# Patient Record
Sex: MALE | Race: WHITE
[De-identification: names, ages, dates, MRNs, and addresses within clinical notes are randomized per-mention and may not be internally consistent; named-entity substitution may affect disease eponyms.]

---

## 2023-05-07 ENCOUNTER — APPOINTMENT (OUTPATIENT)
Dept: CARE COORDINATION | Facility: HOME HEALTH | Age: 88
End: 2023-05-07
Payer: MEDICARE

## 2023-05-07 VITALS — HEIGHT: 71 IN | BODY MASS INDEX: 23.8 KG/M2 | WEIGHT: 170 LBS

## 2023-05-07 DIAGNOSIS — G30.9 ALZHEIMER'S DISEASE, UNSPECIFIED: ICD-10-CM

## 2023-05-07 DIAGNOSIS — G47.00 INSOMNIA, UNSPECIFIED: ICD-10-CM

## 2023-05-07 DIAGNOSIS — F20.9 SCHIZOPHRENIA, UNSPECIFIED: ICD-10-CM

## 2023-05-07 DIAGNOSIS — F02.80 ALZHEIMER'S DISEASE, UNSPECIFIED: ICD-10-CM

## 2023-05-07 DIAGNOSIS — F32.0 MAJOR DEPRESSIVE DISORDER, SINGLE EPISODE, MILD: ICD-10-CM

## 2023-05-07 DIAGNOSIS — I10 ESSENTIAL (PRIMARY) HYPERTENSION: ICD-10-CM

## 2023-05-07 DIAGNOSIS — E78.5 HYPERLIPIDEMIA, UNSPECIFIED: ICD-10-CM

## 2023-05-07 DIAGNOSIS — G20 PARKINSON'S DISEASE: ICD-10-CM

## 2023-05-07 DIAGNOSIS — F32.A DEPRESSION, UNSPECIFIED: ICD-10-CM

## 2023-05-07 DIAGNOSIS — E55.9 VITAMIN D DEFICIENCY, UNSPECIFIED: ICD-10-CM

## 2023-05-07 PROCEDURE — G0444 DEPRESSION SCREEN ANNUAL: CPT | Mod: 26,95

## 2023-05-07 PROCEDURE — 99342 HOME/RES VST NEW LOW MDM 30: CPT | Mod: 95

## 2023-05-07 RX ORDER — SIMVASTATIN 10 MG/1
10 TABLET, FILM COATED ORAL
Qty: 1 | Refills: 0 | Status: ACTIVE | COMMUNITY
Start: 2023-04-10

## 2023-05-07 RX ORDER — CARBIDOPA, LEVODOPA AND ENTACAPONE 25; 100; 200 MG/1; MG/1; MG/1
25-100-200 TABLET, FILM COATED ORAL TWICE DAILY
Refills: 0 | Status: ACTIVE | COMMUNITY
Start: 2023-05-05

## 2023-05-07 RX ORDER — KRILL/OM-3/DHA/EPA/PHOSPHO/AST 1000-230MG
81 CAPSULE ORAL DAILY
Qty: 1 | Refills: 0 | Status: ACTIVE | COMMUNITY
Start: 2023-05-07

## 2023-05-07 RX ORDER — TRAZODONE HYDROCHLORIDE 100 MG/1
100 TABLET ORAL
Qty: 1 | Refills: 0 | Status: ACTIVE | COMMUNITY
Start: 2023-04-12

## 2023-05-07 RX ORDER — ENALAPRIL MALEATE 20 MG/1
20 TABLET ORAL DAILY
Refills: 0 | Status: ACTIVE | COMMUNITY
Start: 2023-02-11

## 2023-05-07 RX ORDER — AMANTADINE HYDROCHLORIDE 100 MG/1
100 CAPSULE ORAL TWICE DAILY
Refills: 0 | Status: ACTIVE | COMMUNITY
Start: 2023-04-12

## 2023-05-07 RX ORDER — ELECTROLYTES/DEXTROSE
10 SOLUTION, ORAL ORAL AT BEDTIME
Qty: 1 | Refills: 0 | Status: ACTIVE | COMMUNITY
Start: 2023-05-07

## 2023-05-07 RX ORDER — DONEPEZIL HYDROCHLORIDE 10 MG/1
10 TABLET ORAL
Qty: 1 | Refills: 0 | Status: ACTIVE | COMMUNITY
Start: 2023-04-12

## 2023-05-07 RX ORDER — RISPERIDONE 0.25 MG/1
0.25 TABLET, FILM COATED ORAL
Refills: 0 | Status: ACTIVE | COMMUNITY
Start: 2023-04-12

## 2023-05-21 PROBLEM — G20 PARKINSON'S DISEASE: Status: ACTIVE | Noted: 2023-05-07

## 2023-05-21 PROBLEM — G30.9 ALZHEIMER'S DISEASE: Status: ACTIVE | Noted: 2023-05-07

## 2023-05-21 PROBLEM — F20.9 SCHIZOPHRENIA: Status: ACTIVE | Noted: 2023-05-07

## 2023-05-21 PROBLEM — F32.0 CURRENT MILD EPISODE OF MAJOR DEPRESSIVE DISORDER WITHOUT PRIOR EPISODE: Status: ACTIVE | Noted: 2023-05-21

## 2023-05-21 NOTE — HEALTH RISK ASSESSMENT
[Yes] : Yes [Monthly or less (1 pt)] : Monthly or less (1 point) [1 or 2 (0 pts)] : 1 or 2 (0 points) [Never (0 pts)] : Never (0 points) [No] : In the past 12 months have you used drugs other than those required for medical reasons? No [Two or more falls in past year] : Patient reported two or more falls in the past year [Assistive Device] : Patient uses an assistive device [Never] : Never [Nearly Every Day (3)] : 3.) Trouble falling asleep, or sleeping too much? Nearly every day [Several Days (1)] : 5.) Poor appetite or overeating? Several days [Not at All (0)] : 9.) Thoughts that you would be off dead or of hurting yourself in some way? Not at all [Mild] : severity of depression is mild [PHQ-9 Positive] : PHQ-9 Positive [I have developed a follow-up plan documented below in the note.] : I have developed a follow-up plan documented below in the note. [Audit-CScore] : 1 [de-identified] : wheel [[id number]] : 7 [Change in mental status noted] : No change in mental status noted [With Family] : lives with family [Feels Safe at Home] : Feels safe at home [Reports changes in hearing] : Reports no changes in hearing [Reports changes in vision] : Reports no changes in vision [Reports normal functional visual acuity (ie: able to read med bottle)] : Reports poor functional visual acuity.  [Reports changes in dental health] : Reports no changes in dental health [Smoke Detector] : smoke detector [Carbon Monoxide Detector] : carbon monoxide detector [Guns at Home] : no guns at home [Seat Belt] :  uses seat belt [Travel to Developing Areas] : does not  travel to developing areas [TB Exposure] : is not being exposed to tuberculosis [de-identified] : Requires A services [de-identified] : Requires A services [Patient/Caregiver unclear of wishes] : , patient/caregiver unclear of wishes [AdvancecareDate] : 05/23

## 2023-05-21 NOTE — HISTORY OF PRESENT ILLNESS
[Home] : at home, [unfilled] , at the time of the visit. [Other Location: e.g. Home (Enter Location, City,State)___] : at [unfilled] [Verbal consent obtained from patient] : the patient, [unfilled] [FreeTextEntry3] : Daughter Lindsay and granddaughter Reyna ([phone]) [de-identified] : d/c thursdays [facility] for 1 week - afib fell - no PCP visit\par \par [facility] quality initiative provider note: 89 year old male with history of Alzheimer's disease, Parkinson disease, Schizophrenia, depression, HLD, HTN, frequent falls. Daughter and granddaughter assist with video vist and provided medical history. Patient doing well. No complaints offered at this time. Patient recently discharged from [place] after sustaining a fall, while hospitalized he was dx with Afib. Patient was hospitalized for a week but did not f/u with PCP upon discharge. Granddaughter she have found the patient on the floor on multiple occasions. Awake, alert and oriented x4, in no acute distress. Daughter report patient is slow deteriorating, he requires additional hours and DME. Denies any chest pain, shortness of breath, palpitation or dizziness. Daughter verified medications and medical diagnosis. Report taking medications as prescribed.

## 2023-05-21 NOTE — CURRENT MEDS
[Takes medication as prescribed] : takes [Yes] : Reviewed medication list for presence of high-risk medications. [None] : Patient does not have any barriers to medication adherence [Sedatives] : sedatives

## 2023-05-21 NOTE — REVIEW OF SYSTEMS
[Dyspnea on Exertion] : dyspnea on exertion [Joint Pain] : joint pain [Joint Stiffness] : joint stiffness [Muscle Weakness] : muscle weakness [Joint Swelling] : joint swelling [Unsteady Walk] : ataxia [Negative] : Heme/Lymph [Suicidal] : not suicidal [Insomnia] : insomnia [Depression] : depression [FreeTextEntry9] : walker/wheelchair

## 2023-05-21 NOTE — ASSESSMENT
[FreeTextEntry1] : Patient requesting House Call Services and  for additional HHA hours, hospital bed and darrell lift\par \par Educated about importance of healthy diet, physical activity, proper sleep (8 hours of sleep), importance of screening test for early detection and treatment of cancer. Importance of immunizations including COVID-19 and seasonal flu vaccine in order to prevent or lessen disease.\par \par Patient encounter incorporated clinical review of the medical record including consultation from specialists, review of lab and diagnostic testing with interpretation and discussion of results with patient and/or primary care taker, general patient counseling and coordination of care as well documentation update within the electronic medical record. All patient questions/concerns addressed during time of visit.\par

## 2023-05-21 NOTE — PHYSICAL EXAM
[de-identified] : Telehealth precludes traditional, comprehensive physical exam. Patient appeared stable and alert.